# Patient Record
Sex: FEMALE | Race: BLACK OR AFRICAN AMERICAN | Employment: FULL TIME | URBAN - METROPOLITAN AREA
[De-identification: names, ages, dates, MRNs, and addresses within clinical notes are randomized per-mention and may not be internally consistent; named-entity substitution may affect disease eponyms.]

---

## 2017-12-01 ENCOUNTER — HOSPITAL ENCOUNTER (OUTPATIENT)
Dept: SLEEP MEDICINE | Age: 52
Discharge: HOME | End: 2017-12-01
Payer: COMMERCIAL

## 2017-12-01 PROCEDURE — 95811 POLYSOM 6/>YRS CPAP 4/> PARM: CPT

## 2017-12-01 PROCEDURE — 95810 POLYSOM 6/> YRS 4/> PARAM: CPT

## 2018-02-09 ENCOUNTER — HOSPITAL ENCOUNTER (OUTPATIENT)
Dept: MAMMOGRAPHY | Age: 53
Discharge: HOME | End: 2018-02-09
Attending: FAMILY MEDICINE
Payer: COMMERCIAL

## 2018-02-09 DIAGNOSIS — Z12.31 VISIT FOR SCREENING MAMMOGRAM: ICD-10-CM

## 2018-02-09 PROCEDURE — 77067 SCR MAMMO BI INCL CAD: CPT

## 2018-05-08 PROBLEM — N70.11 HYDROSALPINX: Status: ACTIVE | Noted: 2018-05-08

## 2019-03-27 ENCOUNTER — HOSPITAL ENCOUNTER (OUTPATIENT)
Dept: GENERAL RADIOLOGY | Age: 54
Discharge: HOME | End: 2019-03-27
Payer: COMMERCIAL

## 2019-03-27 DIAGNOSIS — R22.1 NECK MASS: ICD-10-CM

## 2019-03-27 PROCEDURE — 71046 X-RAY EXAM CHEST 2 VIEWS: CPT

## 2019-04-01 ENCOUNTER — HOSPITAL ENCOUNTER (OUTPATIENT)
Dept: MAMMOGRAPHY | Age: 54
Discharge: HOME | End: 2019-04-01
Attending: FAMILY MEDICINE
Payer: COMMERCIAL

## 2019-04-01 DIAGNOSIS — Z12.39 ENCOUNTER FOR SCREENING BREAST EXAMINATION: ICD-10-CM

## 2019-04-01 PROCEDURE — 77067 SCR MAMMO BI INCL CAD: CPT

## 2020-09-02 ENCOUNTER — HOSPITAL ENCOUNTER (OUTPATIENT)
Dept: MAMMOGRAPHY | Age: 55
Discharge: HOME | End: 2020-09-02
Attending: FAMILY MEDICINE
Payer: COMMERCIAL

## 2020-09-02 DIAGNOSIS — Z12.31 OTHER SCREENING MAMMOGRAM: ICD-10-CM

## 2020-09-02 PROCEDURE — 77067 SCR MAMMO BI INCL CAD: CPT

## 2021-07-23 ENCOUNTER — TRANSCRIBE ORDER (OUTPATIENT)
Dept: SCHEDULING | Age: 56
End: 2021-07-23

## 2021-07-23 DIAGNOSIS — Z12.31 VISIT FOR SCREENING MAMMOGRAM: Primary | ICD-10-CM

## 2021-09-01 ENCOUNTER — HOSPITAL ENCOUNTER (OUTPATIENT)
Dept: MAMMOGRAPHY | Age: 56
Discharge: HOME | End: 2021-09-01
Attending: FAMILY MEDICINE
Payer: COMMERCIAL

## 2021-09-01 DIAGNOSIS — Z12.31 VISIT FOR SCREENING MAMMOGRAM: ICD-10-CM

## 2021-09-01 PROCEDURE — 77063 BREAST TOMOSYNTHESIS BI: CPT

## 2021-12-07 PROBLEM — E78.5 DYSLIPIDEMIA: Status: ACTIVE | Noted: 2020-05-27

## 2022-03-19 PROBLEM — N70.11 HYDROSALPINX: Status: ACTIVE | Noted: 2018-05-08

## 2022-03-19 PROBLEM — E78.5 DYSLIPIDEMIA: Status: ACTIVE | Noted: 2020-05-27

## 2022-08-17 ENCOUNTER — OFFICE VISIT (OUTPATIENT)
Dept: OCCUPATIONAL MEDICINE | Age: 57
End: 2022-08-17

## 2022-08-17 VITALS
SYSTOLIC BLOOD PRESSURE: 114 MMHG | RESPIRATION RATE: 16 BRPM | HEART RATE: 78 BPM | TEMPERATURE: 98.1 F | WEIGHT: 200 LBS | DIASTOLIC BLOOD PRESSURE: 78 MMHG | BODY MASS INDEX: 31.32 KG/M2 | OXYGEN SATURATION: 99 %
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
## 2022-08-17 PROBLEM — E78.5 DYSLIPIDEMIA: Status: ACTIVE | Noted: 2020-05-27

## 2022-08-17 RX ORDER — ATORVASTATIN CALCIUM 20 MG/1
20 TABLET, FILM COATED ORAL EVERY EVENING
COMMUNITY
Start: 2021-11-14

## 2022-08-17 ASSESSMENT — ENCOUNTER SYMPTOMS
SORE THROAT: 0
RHINORRHEA: 0
COUGH: 0
VOMITING: 0
NAIL CHANGES: 0
EYE PAIN: 0
SHORTNESS OF BREATH: 0
DIARRHEA: 0

## 2022-08-17 NOTE — PROGRESS NOTES
PROGRESS NOTE    SUBJECTIVE:   Nellie Moody is a 65 y.o. female seen for a rash to her lower legs and arms that she noticed yesterday. Reports that she was outside for various family events Saturday and was outside Sunday evening but does not recall noticing an increase in insect activity around her. Denies fever, chills, chest pain, shortness of breath, known poison ivy/oak exposure, changes to her soaps/detergents/perfumes/lotions, new foods/supplements/medications, and/or others in the household with similar symptoms. Denies use of OTC therapies for symptom relief. Chief Complaint    Rash         Rash  This is a new problem. The current episode started yesterday. The affected locations include the left arm and right lower leg. It is unknown if there was an exposure to a precipitant. Pertinent negatives include no anorexia, congestion, cough, diarrhea, eye pain, facial edema, fatigue, fever, joint pain, nail changes, rhinorrhea, shortness of breath, sore throat or vomiting. Past treatments include moisturizer. The treatment provided no relief. Current Outpatient Medications   Medication Sig Dispense Refill    atorvastatin (LIPITOR) 20 MG tablet Take 20 mg by mouth every evening      VITAMIN D PO Take by mouth      ELDERBERRY PO Take by mouth      Multiple Vitamins-Minerals (EMERGEN-C VITAMIN C PO) Take by mouth      Multiple Vitamin (MULTIVITAMIN PO) Take by mouth      esomeprazole (NEXIUM) 40 MG delayed release capsule Take 40 mg by mouth daily      fluticasone (FLONASE) 50 MCG/ACT nasal spray 2 sprays by Nasal route      hydroCHLOROthiazide (HYDRODIURIL) 12.5 MG tablet Take 12.5 mg by mouth daily      irbesartan (AVAPRO) 150 MG tablet Take 150 mg by mouth      loratadine (CLARITIN) 10 MG tablet Take 10 mg by mouth       No current facility-administered medications for this visit.       Allergies   Allergen Reactions    Codeine Hives     Pt states she is not allergic    Hydrocodone-Acetaminophen Other (See Comments) and Rash     Pt states dizzy and funny feeling  Pt states dizzy and funny feeling       Social History     Tobacco Use    Smoking status: Never    Smokeless tobacco: Never   Substance Use Topics    Alcohol use: No     Alcohol/week: 0.0 standard drinks    Drug use: No        Review of Systems   Constitutional:  Negative for chills, fatigue and fever. HENT:  Negative for congestion, rhinorrhea and sore throat. Eyes:  Negative for pain. Respiratory:  Negative for cough and shortness of breath. Cardiovascular:  Negative for chest pain. Gastrointestinal:  Negative for anorexia, diarrhea and vomiting. Musculoskeletal:  Negative for joint pain. Skin:  Positive for rash. Negative for nail changes. OBJECTIVE:  /78 (Site: Left Upper Arm, Position: Sitting, Cuff Size: Large Adult)   Pulse 78   Temp 98.1 °F (36.7 °C) (Oral)   Resp 16   Wt 200 lb (90.7 kg)   SpO2 99%   BMI 31.32 kg/m²      No results found for this visit on 08/17/22. Physical Exam  Vitals reviewed. Constitutional:       General: She is awake. She is not in acute distress. Appearance: Normal appearance. She is well-developed and well-groomed. Eyes:      Pupils: Pupils are equal, round, and reactive to light. Pupils are equal.      Right eye: Pupil is not sluggish. Left eye: Pupil is not sluggish. Cardiovascular:      Rate and Rhythm: Normal rate and regular rhythm. Heart sounds: Normal heart sounds. Pulmonary:      Effort: Pulmonary effort is normal.      Breath sounds: Normal breath sounds. Skin:     Findings: Rash present. Rash is papular. Comments: Scattered raised, slightly erythematous papules noted to bilateral lower legs and arms suspicious for mosquito bites   Neurological:      Mental Status: She is alert and oriented to person, place, and time. Motor: No weakness. Coordination: Coordination is intact. Coordination normal.      Gait: Gait is intact.  Gait normal. Psychiatric:         Behavior: Behavior is cooperative. ASSESSMENT and PLAN    Brad Lerner was seen today for rash. Diagnoses and all orders for this visit:    Papular rash  Comments: Instructed to apply OTC hydrocortisone per package instructions for symptoms relief. Monitor for changes to lesions that could indicate an infection    Mosquito bite, initial encounter  Comments: Instructed to apply OTC hydrocortisone per package instructions for symptoms relief. Monitor for changes to lesions that could indicate an infection    Counseled on benefits of having a primary care provider which includes, but is not limited to, continuity of care and having a medical home when concerns arise. Also enforced that onsite clinic policy states that we are not to take the place of a primary care provider, pt verbalized understanding. SEs and risk vs benefits associated with medications prescribed discussed with patient who verbalized understanding. Pt verbalized understanding and agreement with plan of care. RTC for persisting/worsening symptoms or new complaints that arise. Discussed signs and symptoms that would warrant immediate evaluation including, but not limited to pain, fever >100.5, redness/swelling/drainage or other signs of infection, HA, blurred vision, speech disturbance, difficulty with ambulation/gait, numbness, tingling, weakness, syncope, chest pain, or shortness of breath. I have reviewed the patient's medication list, past medical, family, social, and surgical history in detail and updated the patient record appropriately.     Benigno Rollins, DECLAN - CNP

## 2022-09-07 ENCOUNTER — OFFICE VISIT (OUTPATIENT)
Dept: OCCUPATIONAL MEDICINE | Age: 57
End: 2022-09-07

## 2022-09-07 VITALS
SYSTOLIC BLOOD PRESSURE: 146 MMHG | HEART RATE: 85 BPM | TEMPERATURE: 98.4 F | OXYGEN SATURATION: 99 % | RESPIRATION RATE: 16 BRPM | DIASTOLIC BLOOD PRESSURE: 84 MMHG

## 2022-09-07 DIAGNOSIS — M79.18 INTERCOSTAL MUSCLE PAIN: Primary | ICD-10-CM

## 2022-09-07 ASSESSMENT — ENCOUNTER SYMPTOMS
BACK PAIN: 1
BOWEL INCONTINENCE: 0
ABDOMINAL PAIN: 0

## 2022-09-07 NOTE — PROGRESS NOTES
PROGRESS NOTE    SUBJECTIVE:   Franc Mast is a 65 y.o. female seen for a tightness sensation to her right rib cage that has been intermittent for the past 1-2 weeks but has been persistent all day today. Denies HA, SHOB, difficulty with speech or understanding the speech of others, blurred vision, heartburn/indigestion, abdominal pain, changes in BMs, gait/coordination issues, dizziness, loss of consciousness, syncope/near-syncope, known injury, pain with palpation in the area, or numbness/tingling/weakness in face/arms/legs. Chief Complaint    Back Pain         Back Pain  This is a new problem. The current episode started 1 to 4 weeks ago. The problem has been waxing and waning since onset. The pain is present in the thoracic spine. Quality: tightness. The pain is The same all the time. Pertinent negatives include no abdominal pain, bladder incontinence, bowel incontinence, chest pain, dysuria, fever, headaches, leg pain, numbness, paresis, paresthesias, pelvic pain, perianal numbness, tingling, weakness or weight loss. She has tried nothing for the symptoms. Current Outpatient Medications   Medication Sig Dispense Refill    Multiple Vitamins-Minerals (EMERGEN-C VITAMIN C PO) Take by mouth      Multiple Vitamin (MULTIVITAMIN PO) Take by mouth      atorvastatin (LIPITOR) 20 MG tablet Take 20 mg by mouth every evening      VITAMIN D PO Take by mouth      ELDERBERRY PO Take by mouth      esomeprazole (NEXIUM) 40 MG delayed release capsule Take 40 mg by mouth daily      fluticasone (FLONASE) 50 MCG/ACT nasal spray 2 sprays by Nasal route      hydroCHLOROthiazide (HYDRODIURIL) 12.5 MG tablet Take 12.5 mg by mouth daily      irbesartan (AVAPRO) 150 MG tablet Take 150 mg by mouth      loratadine (CLARITIN) 10 MG tablet Take 10 mg by mouth       No current facility-administered medications for this visit.       Allergies   Allergen Reactions    Codeine Hives     Pt states she is not allergic Hydrocodone-Acetaminophen Other (See Comments) and Rash     Pt states dizzy and funny feeling  Pt states dizzy and funny feeling       Social History     Tobacco Use    Smoking status: Never    Smokeless tobacco: Never   Substance Use Topics    Alcohol use: No     Alcohol/week: 0.0 standard drinks    Drug use: No        Review of Systems   Constitutional:  Negative for fever and weight loss. Cardiovascular:  Negative for chest pain. Gastrointestinal:  Negative for abdominal pain and bowel incontinence. Genitourinary:  Negative for bladder incontinence, dysuria and pelvic pain. Musculoskeletal:  Positive for back pain. Neurological:  Negative for tingling, weakness, numbness, headaches and paresthesias. OBJECTIVE:  BP (!) 146/84 (Site: Left Upper Arm, Position: Sitting, Cuff Size: Large Adult)   Pulse 85   Temp 98.4 °F (36.9 °C) (Oral)   Resp 16   SpO2 99%      No results found for this visit on 09/07/22. Physical Exam  Vitals reviewed. Constitutional:       General: She is awake. She is not in acute distress. Appearance: Normal appearance. She is well-developed and well-groomed. HENT:      Head: Normocephalic. Eyes:      Pupils: Pupils are equal, round, and reactive to light. Pupils are equal.      Right eye: Pupil is not sluggish. Left eye: Pupil is not sluggish. Funduscopic exam:     Right eye: Red reflex present. Left eye: Red reflex present. Cardiovascular:      Rate and Rhythm: Normal rate and regular rhythm. Heart sounds: Normal heart sounds. Pulmonary:      Effort: Pulmonary effort is normal.      Breath sounds: Normal breath sounds. Musculoskeletal:      Cervical back: Neck supple. No swelling, edema, deformity, erythema, signs of trauma, lacerations, rigidity, spasms, torticollis, tenderness, bony tenderness or crepitus. No pain with movement. Normal range of motion. Thoracic back: Spasms and tenderness present.  No swelling, edema, deformity, signs of trauma, lacerations or bony tenderness. Normal range of motion. No scoliosis. Lumbar back: No swelling, edema, deformity, signs of trauma, lacerations, spasms, tenderness or bony tenderness. Normal range of motion. Negative right straight leg raise test and negative left straight leg raise test. No scoliosis. Back:    Skin:     General: Skin is warm and dry. Neurological:      Mental Status: She is alert and oriented to person, place, and time. Cranial Nerves: Cranial nerves are intact. Sensory: Sensation is intact. Motor: Motor function is intact. No weakness. Coordination: Coordination is intact. Coordination normal.      Gait: Gait is intact. Gait normal.      Deep Tendon Reflexes:      Reflex Scores:       Patellar reflexes are 2+ on the right side and 2+ on the left side. Psychiatric:         Behavior: Behavior is cooperative. ASSESSMENT and PLAN    Genna Barnard was seen today for back pain. Diagnoses and all orders for this visit:    Intercostal muscle pain  Comments:  Encouraged cold/heat, massage therapy, stretches, OTC NSAIDs per package instruction, and OTC muscle rubs for relief. F/U with PCP if no improvement      Counseled on benefits of having a primary care provider which includes, but is not limited to, continuity of care and having a medical home when concerns arise. Also enforced that onsite clinic policy states that we are not to take the place of a primary care provider, pt verbalized understanding. SEs and risk vs benefits associated with medications prescribed discussed with patient who verbalized understanding. Pt verbalized understanding and agreement with plan of care. RTC for persisting/worsening symptoms or new complaints that arise.  Discussed signs and symptoms that would warrant immediate evaluation including, but not limited to HA, blurred vision, speech disturbance, difficulty with ambulation/gait, numbness, tingling, weakness, syncope, chest pain, or shortness of breath. I have reviewed the patient's medication list, past medical, family, social, and surgical history in detail and updated the patient record appropriately.     DECLAN Pereira - CNP

## 2022-09-27 ENCOUNTER — HOSPITAL ENCOUNTER (OUTPATIENT)
Dept: MAMMOGRAPHY | Age: 57
Discharge: HOME | End: 2022-09-30
Payer: COMMERCIAL

## 2022-09-27 DIAGNOSIS — Z12.31 ENCOUNTER FOR SCREENING MAMMOGRAM FOR MALIGNANT NEOPLASM OF BREAST: ICD-10-CM

## 2022-09-27 PROCEDURE — 77063 BREAST TOMOSYNTHESIS BI: CPT

## 2022-10-27 ENCOUNTER — OFFICE VISIT (OUTPATIENT)
Dept: OCCUPATIONAL MEDICINE | Age: 57
End: 2022-10-27

## 2022-10-27 VITALS
SYSTOLIC BLOOD PRESSURE: 112 MMHG | DIASTOLIC BLOOD PRESSURE: 76 MMHG | RESPIRATION RATE: 16 BRPM | HEART RATE: 80 BPM | TEMPERATURE: 97.9 F | OXYGEN SATURATION: 99 %

## 2022-10-27 DIAGNOSIS — H61.22 IMPACTED CERUMEN OF LEFT EAR: Primary | ICD-10-CM

## 2022-10-27 ASSESSMENT — ENCOUNTER SYMPTOMS
EYE ITCHING: 0
EYE DISCHARGE: 0
SINUS PAIN: 0
COUGH: 0
ABDOMINAL PAIN: 0
DIARRHEA: 0
EYE PAIN: 0
EYE REDNESS: 0
RHINORRHEA: 0
SINUS PRESSURE: 0
SORE THROAT: 0
VOMITING: 0

## 2022-10-27 NOTE — PROGRESS NOTES
PROGRESS NOTE    SUBJECTIVE:   Christopher Faith is a 65 y.o. female seen for issue with her left ear for the past 2 weeks. Reports that when she lays on her left ear at night that it is \"sore\" and that her hearing is muffled some. Chief Complaint    Ear Fullness         Ear Fullness   There is pain in the left ear. This is a new problem. The current episode started 1 to 4 weeks ago. The problem has been unchanged. There has been no fever. Pertinent negatives include no abdominal pain, coughing, diarrhea, ear discharge, headaches, hearing loss, neck pain, rash, rhinorrhea, sore throat or vomiting. She has tried nothing for the symptoms. Current Outpatient Medications   Medication Sig Dispense Refill    Multiple Vitamins-Minerals (EMERGEN-C VITAMIN C PO) Take by mouth      Multiple Vitamin (MULTIVITAMIN PO) Take by mouth      atorvastatin (LIPITOR) 20 MG tablet Take 20 mg by mouth every evening      VITAMIN D PO Take by mouth      ELDERBERRY PO Take by mouth      esomeprazole (NEXIUM) 40 MG delayed release capsule Take 40 mg by mouth daily      fluticasone (FLONASE) 50 MCG/ACT nasal spray 2 sprays by Nasal route      hydroCHLOROthiazide (HYDRODIURIL) 12.5 MG tablet Take 12.5 mg by mouth daily      irbesartan (AVAPRO) 150 MG tablet Take 150 mg by mouth      loratadine (CLARITIN) 10 MG tablet Take 10 mg by mouth       No current facility-administered medications for this visit. Allergies   Allergen Reactions    Codeine Hives     Pt states she is not allergic    Hydrocodone-Acetaminophen Other (See Comments) and Rash     Pt states dizzy and funny feeling  Pt states dizzy and funny feeling       Social History     Tobacco Use    Smoking status: Never    Smokeless tobacco: Never   Substance Use Topics    Alcohol use: No     Alcohol/week: 0.0 standard drinks    Drug use: No        Review of Systems   Constitutional:  Negative for chills, fatigue and fever. HENT:  Positive for ear pain.  Negative for ear discharge, hearing loss, postnasal drip, rhinorrhea, sinus pressure, sinus pain, sneezing and sore throat. Eyes:  Negative for pain, discharge, redness and itching. Respiratory:  Negative for cough. Gastrointestinal:  Negative for abdominal pain, diarrhea and vomiting. Musculoskeletal:  Negative for myalgias and neck pain. Skin:  Negative for rash. Neurological:  Negative for dizziness, light-headedness and headaches. OBJECTIVE:  /76 (Site: Left Upper Arm, Position: Sitting, Cuff Size: Medium Adult)   Pulse 80   Temp 97.9 °F (36.6 °C) (Oral)   Resp 16   SpO2 99%      No results found for this visit on 10/27/22. Physical Exam  Vitals reviewed. Constitutional:       General: She is awake. She is not in acute distress. Appearance: Normal appearance. She is well-developed and well-groomed. HENT:      Head: Normocephalic. Right Ear: Hearing, ear canal and external ear normal. No drainage, swelling or tenderness. A middle ear effusion is present. Left Ear: Hearing, ear canal and external ear normal. No drainage, swelling or tenderness. A middle ear effusion is present. There is impacted cerumen. Nose: Mucosal edema and rhinorrhea present. Rhinorrhea is clear. Right Turbinates: Swollen and pale. Left Turbinates: Swollen and pale. Right Sinus: No maxillary sinus tenderness or frontal sinus tenderness. Left Sinus: No maxillary sinus tenderness or frontal sinus tenderness. Mouth/Throat:      Mouth: Mucous membranes are moist.      Pharynx: Oropharynx is clear. Uvula midline. No pharyngeal swelling or posterior oropharyngeal erythema. Tonsils: No tonsillar exudate. Comments: PND with cobblestoning  Eyes:      General: Lids are normal. No allergic shiner. Conjunctiva/sclera: Conjunctivae normal.      Right eye: Right conjunctiva is not injected. No chemosis. Left eye: Left conjunctiva is not injected. No chemosis.   Neck:      Thyroid: No thyromegaly. Trachea: Trachea normal.   Cardiovascular:      Rate and Rhythm: Normal rate and regular rhythm. Heart sounds: Normal heart sounds. Pulmonary:      Effort: Pulmonary effort is normal.      Breath sounds: Normal breath sounds. Musculoskeletal:      Cervical back: Normal range of motion and neck supple. Lymphadenopathy:      Head:      Right side of head: No submental, submandibular, tonsillar, preauricular, posterior auricular or occipital adenopathy. Left side of head: No submental, submandibular, tonsillar, preauricular, posterior auricular or occipital adenopathy. Skin:     General: Skin is warm and dry. Neurological:      Mental Status: She is alert and oriented to person, place, and time. Psychiatric:         Behavior: Behavior is cooperative. ASSESSMENT and PLAN    Helga Jeffrey was seen today for ear fullness. Diagnoses and all orders for this visit:    Impacted cerumen of left ear  Comments:  Cerumen impaction removed with currete, pt tolerated procedure well. Discussed avoiding qtip use for ear cleaning and to alternate earbud use to prevent    Orders:  -     [address] - [place] REMOVE IMPACTED EAR WAX      Counseled on benefits of having a primary care provider which includes, but is not limited to, continuity of care and having a medical home when concerns arise. Also enforced that onsite clinic policy states that we are not to take the place of a primary care provider, pt verbalized understanding. SEs and risk vs benefits associated with medications prescribed discussed with patient who verbalized understanding. Pt verbalized understanding and agreement with plan of care. RTC for persisting/worsening symptoms or new complaints that arise.  Discussed signs and symptoms that would warrant immediate evaluation including, but not limited to HA, blurred vision, speech disturbance, difficulty with ambulation/gait, numbness, tingling, weakness, syncope, chest pain, or shortness of breath. I have reviewed the patient's medication list, past medical, family, social, and surgical history in detail and updated the patient record appropriately.     DECLAN Stanford - CNP

## 2022-11-16 ENCOUNTER — OFFICE VISIT (OUTPATIENT)
Dept: OCCUPATIONAL MEDICINE | Age: 57
End: 2022-11-16

## 2022-11-16 DIAGNOSIS — Z71.2 ENCOUNTER TO DISCUSS TEST RESULTS: Primary | ICD-10-CM

## 2022-11-16 NOTE — PROGRESS NOTES
PROGRESS NOTE    SUBJECTIVE:   Rosalee Cook is a 65 y.o. female seen for results of recent employer Health Risk Assessment. Previous results will also be reviewed to show how values have been trending. Established patient . No complaints or concerns at this time  Chief Complaint    Results            Current Outpatient Medications   Medication Sig Dispense Refill    Multiple Vitamins-Minerals (EMERGEN-C VITAMIN C PO) Take by mouth      Multiple Vitamin (MULTIVITAMIN PO) Take by mouth      atorvastatin (LIPITOR) 20 MG tablet Take 20 mg by mouth every evening      VITAMIN D PO Take by mouth      ELDERBERRY PO Take by mouth      esomeprazole (NEXIUM) 40 MG delayed release capsule Take 40 mg by mouth daily      fluticasone (FLONASE) 50 MCG/ACT nasal spray 2 sprays by Nasal route      hydroCHLOROthiazide (HYDRODIURIL) 12.5 MG tablet Take 12.5 mg by mouth daily      irbesartan (AVAPRO) 150 MG tablet Take 150 mg by mouth      loratadine (CLARITIN) 10 MG tablet Take 10 mg by mouth       No current facility-administered medications for this visit. Allergies   Allergen Reactions    Codeine Hives     Pt states she is not allergic    Hydrocodone-Acetaminophen Other (See Comments) and Rash     Pt states dizzy and funny feeling  Pt states dizzy and funny feeling       Social History     Tobacco Use    Smoking status: Never    Smokeless tobacco: Never   Substance Use Topics    Alcohol use: No     Alcohol/week: 0.0 standard drinks    Drug use: No           OBJECTIVE:  There were no vitals taken for this visit. T. CHOL: 173  T  HDL: 42  VLDL: 13  LDL: 118    GLUCOSE: 92  A1C: 5.7    ASSESSMENT and PLAN    Magdy Jeff was seen today for results.     Diagnoses and all orders for this visit:    Encounter to discuss test results    Discussed mid-year screening event usually around May if she would like to attempt for the credits she missed for the remainder of the year at that time and what goals she should strive for to meet the

## 2023-02-15 ENCOUNTER — OFFICE VISIT (OUTPATIENT)
Dept: VASCULAR SURGERY | Age: 58
End: 2023-02-15
Payer: COMMERCIAL

## 2023-02-15 VITALS
BODY MASS INDEX: 32.18 KG/M2 | HEIGHT: 67 IN | DIASTOLIC BLOOD PRESSURE: 69 MMHG | WEIGHT: 205 LBS | OXYGEN SATURATION: 100 % | SYSTOLIC BLOOD PRESSURE: 116 MMHG | HEART RATE: 69 BPM

## 2023-02-15 DIAGNOSIS — E78.5 DYSLIPIDEMIA: Primary | ICD-10-CM

## 2023-02-15 PROCEDURE — 99213 OFFICE O/P EST LOW 20 MIN: CPT | Performed by: NURSE PRACTITIONER

## 2023-02-15 PROCEDURE — 3078F DIAST BP <80 MM HG: CPT | Performed by: NURSE PRACTITIONER

## 2023-02-15 PROCEDURE — 3074F SYST BP LT 130 MM HG: CPT | Performed by: NURSE PRACTITIONER

## 2023-02-15 NOTE — PROGRESS NOTES
DATE OF VISIT: 2/15/2023      HPI  Nellie Moody is a 65 y.o. female is here in follow up for her yearly follow up for her upper extremity duplex. No issues since her last visit. She is taking cholesterol lowering medication. No ASA or blooding thinning medication. MEDICAL HISTORY:   Past Medical History:   Diagnosis Date    DDD (degenerative disc disease), cervical 2016    assoc radicular pain to shoulder/arm, xray cspine 2017    H/O pelvic ultrasound 06/2015    + hydrosalpinx    Hiatal hernia with GERD 07/2015    s/p EGD + [facility], + esophageal ring    HLD (hyperlipidemia) 2015    atorvastatin 12/2016, kaleigh score 0 may 2015    HTN (hypertension) 1/1/2016    Exercise Nuclear Stress test 5/2016 = normal, CXR nl, toprol = fatigue/mood    HTN (hypertension) 1/1/2016    HTN (hypertension) 2016    Exercise Nuclear Stress test 5/2016 = normal, CXR nl, toprol = fatigue/mood    Internal hemorrhoid 07/2015    Perimenopausal 2016    conrad root         SURGICAL HISTORY:   Past Surgical History:   Procedure Laterality Date    GYN      hysterectomy    HYSTERECTOMY (CERVIX STATUS UNKNOWN)      MYOMECTOMY      PARTIAL HYSTERECTOMY (CERVIX NOT REMOVED)      2003    [place] ABDOMEN SURGERY PROC UNLISTED      hernia    TUBAL LIGATION         Review of Systems:  Constitutional:   Negative for fevers and unexplained weight loss. Respiratory:   Negative for hemoptysis. Cardiovascular:   Negative except as noted in HPI. Musculoskeletal:  Negative for active, unexplained/severe joint pain.       ALLERGIES:   Allergies   Allergen Reactions    Codeine Hives     Pt states she is not allergic    Hydrocodone-Acetaminophen Other (See Comments) and Rash     Pt states dizzy and funny feeling  Pt states dizzy and funny feeling       CURRENT MEDICATIONS:  Current Outpatient Medications   Medication Sig Dispense Refill    Multiple Vitamins-Minerals (EMERGEN-C VITAMIN C PO) Take by mouth      Multiple Vitamin (MULTIVITAMIN PO) Take by mouth atorvastatin (LIPITOR) 20 MG tablet Take 20 mg by mouth every evening      VITAMIN D PO Take by mouth      ELDERBERRY PO Take by mouth      esomeprazole (NEXIUM) 40 MG delayed release capsule Take 40 mg by mouth daily      fluticasone (FLONASE) 50 MCG/ACT nasal spray 2 sprays by Nasal route      hydroCHLOROthiazide (HYDRODIURIL) 12.5 MG tablet Take 12.5 mg by mouth daily      irbesartan (AVAPRO) 150 MG tablet Take 150 mg by mouth      loratadine (CLARITIN) 10 MG tablet Take 10 mg by mouth       No current facility-administered medications for this visit. IMAGING: Interpretation Summary         Right side findings: WBI is 0.98. The upper extremity arterial duplex reveals no significant arterial disease. The proximal subclavian artery measures 1.7cm AP essentially unchanged from previous exam dated 12/16/2020. Left side findings: WBI is 0.98. The upper extremity arterial duplex reveals no significant arterial disease. Procedure Details    A gray scale, color Doppler imaging and spectral Doppler analysis ultrasound was performed. During the study longitudinal views were obtained. Pulsed wave doppler, pulsed volume recording (PVR) and photo plethysmography was performed. Overall the study quality was good. Upper Extremity Arterial Findings    Right Upper Arterial    Innominate Artery: Multiphasic. Subclavian Artery: Multiphasic. Axillary Artery: Multiphasic. Brachial Artery: Multiphasic. Radial Artery: Multiphasic. Ulnar Artery: multiphasic. Left Upper Arterial    Subclavian Artery: Multiphasic. Axillary Artery: Multiphasic. Brachial Artery: Multiphasic. Radial Artery: Multiphasic. Ulnar Artery: Multiphasic.      Carotid Measurements     Right PSV Right EDV Left PSV Left EDV   Vertebral 59.1 cm/s        17 cm/s        54.7 cm/s        19.3 cm/s          Subclavian Prox 224 cm/s            137 cm/s              Subclavian Mid 140 cm/s        10.3 cm/s        107 cm/s 0 cm/s          Subclavian Dist 123 cm/s            119 cm/s                Upper Arterial Measurements     PSV Right PSV Right EDV Left PSV Left EDV   Innominate 195 cm/s              Axillary  130 cm/s         124 cm/s           Brach Prox  151 cm/s        0 cm/s        137 cm/s        14.8 cm/s          Brach Mid  124 cm/s        0 cm/s        126 cm/s        0 cm/s          Brach Dist  108 cm/s        1.9 cm/s        118 cm/s        0 cm/s          Radial Prox  78.2 cm/s         91.7 cm/s           Radial Mid  106 cm/s         91.1 cm/s           Radial Dist  97.6 cm/s         97.4 cm/s           Ulnar Dist  119 cm/s         96.5 cm/s             Arterial Pressure Measurements     Right Left   3rd Digit 103 mmHg        117 mmHg          DBI (3rd) 0.82        0.94          Radial  mmHg        116 mmHg          Ulnar  mmHg        122 mmHg          Brachial  mmHg        121 mmHg          WBI 0.98        0.98                                    Procedure Staff    Technologist/Clinician: Valdo Norwood  Supporting Staff: None  Performing Physician/Midlevel: None     Exam Completion Date/Time: 2/15/23  2:52 PM      PHYSICAL EXAM  VITALS: /69 (Site: Left Upper Arm, Position: Sitting, Cuff Size: Medium Adult)   Pulse 69   Ht 5' 7\" (1.702 m)   Wt 205 lb (93 kg)   SpO2 100%   BMI 32.11 kg/m²       GENERAL: Well developed, well nourished, in NAD  HEAD/NECK: normocephalic, atraumatic, neck supple  HEART: Regular rate and rhythm  ABDOMEN: soft, nontender, nondistended  EXTREMITIES: upper without CCE with palpable radial and brachial pulses. MUSCULOSKELETAL: normal gait  NEURO: sensation and strength grossly intact and symmetrical  PSYCH: alert and oriented to person, place and time      Assessment/Plan: Patient is a 78-year-old female who follows up for her upper extremity duplex. She denies any upper extremity pain or ulcerations.   The proximal subclavian artery on the right measures 1.7 cm which has essentially remained stable  and has not changed in size. We will continue to follow her with ultrasound duplex studies in 1 year. She will return in 1 year sooner should any issues arise.          Pauly Tello, APRN - CNP    20 minutes of time was spent on this encounter including chart review, assessment and evaluation

## 2023-05-11 ENCOUNTER — OFFICE VISIT (OUTPATIENT)
Dept: OCCUPATIONAL MEDICINE | Age: 58
End: 2023-05-11

## 2023-05-11 VITALS
TEMPERATURE: 97.4 F | SYSTOLIC BLOOD PRESSURE: 144 MMHG | DIASTOLIC BLOOD PRESSURE: 92 MMHG | HEART RATE: 65 BPM | OXYGEN SATURATION: 99 %

## 2023-05-11 DIAGNOSIS — Z01.30 BLOOD PRESSURE CHECK: Primary | ICD-10-CM

## 2023-05-11 ASSESSMENT — ENCOUNTER SYMPTOMS
ABDOMINAL PAIN: 0
COUGH: 0
SHORTNESS OF BREATH: 0
CHOKING: 0
CHEST TIGHTNESS: 1

## 2023-05-11 NOTE — PROGRESS NOTES
PROGRESS NOTE    SUBJECTIVE:   Nava Bishop is a 63 y.o. female seen for blood pressure check. She checked her blood pressure Tuesday afternoon at her sisters house and noticed it was high, so she went to the emergency department. While at the ED her blood pressure gradually decreased without intervention. She states she missed her blood pressure medication 2 days ago, but has caught up now. She got home from the emergency department around 4:30am Wednesday morning, she was able to stay home and rest yesterday. Today at work she checked her blood pressure at her desk and it was high, so she decided to come to the clinic. She does have chest tightness. She states she does not have a headache, difficulty breathing, dizziness, nausea, or vomiting. She states she has been under stress at home with a new puppy, which she states may be associating to her increased blood pressure and chest pain. She states is having back pain, she states this is a muscular pain and caused by a knot in her neck. BLOOD PRESSURE CHECK      Current Outpatient Medications   Medication Sig Dispense Refill    Multiple Vitamins-Minerals (EMERGEN-C VITAMIN C PO) Take by mouth      Multiple Vitamin (MULTIVITAMIN PO) Take by mouth      atorvastatin (LIPITOR) 20 MG tablet Take 20 mg by mouth every evening      VITAMIN D PO Take by mouth      ELDERBERRY PO Take by mouth      esomeprazole (NEXIUM) 40 MG delayed release capsule Take 40 mg by mouth daily      fluticasone (FLONASE) 50 MCG/ACT nasal spray 2 sprays by Nasal route      hydroCHLOROthiazide (HYDRODIURIL) 12.5 MG tablet Take 12.5 mg by mouth daily      irbesartan (AVAPRO) 150 MG tablet Take 150 mg by mouth      loratadine (CLARITIN) 10 MG tablet Take 10 mg by mouth       No current facility-administered medications for this visit.       Allergies   Allergen Reactions    Codeine Hives     Pt states she is not allergic    Hydrocodone-Acetaminophen Other (See Comments) and Rash     Pt

## 2023-06-06 ENCOUNTER — OFFICE VISIT (OUTPATIENT)
Dept: OCCUPATIONAL MEDICINE | Age: 58
End: 2023-06-06

## 2023-06-06 VITALS — HEIGHT: 66 IN | WEIGHT: 195.6 LBS | BODY MASS INDEX: 31.43 KG/M2

## 2023-06-06 DIAGNOSIS — Z00.8 ENCOUNTER FOR BIOMETRIC SCREENING: Primary | ICD-10-CM

## 2023-06-06 RX ORDER — LORAZEPAM 0.5 MG/1
TABLET ORAL
COMMUNITY
Start: 2023-05-15

## 2023-06-06 NOTE — PROGRESS NOTES
PROGRESS NOTE    SUBJECTIVE:   Demetria Sauceda is a 63 y.o. female seen for quarterly employment physical to see if their values have improved since last screening to gain any previously missed insurance premium discounts for remainder of the year. No complaints or concerns at this time. Chief Complaint    Employment Physical           OBJECTIVE:  Ht 5' 6\" (1.676 m)   Wt 195 lb 9.6 oz (88.7 kg)   BMI 31.57 kg/m²      Physical Exam  Vitals reviewed. Constitutional:       General: She is awake. She is not in acute distress. Appearance: Normal appearance. She is well-developed and well-groomed. Eyes:      Pupils: Pupils are equal, round, and reactive to light. Pupils are equal.      Right eye: Pupil is not sluggish. Left eye: Pupil is not sluggish. Pulmonary:      Effort: Pulmonary effort is normal.   Neurological:      Mental Status: She is alert and oriented to person, place, and time. Motor: No weakness. Coordination: Coordination is intact. Coordination normal.      Gait: Gait is intact. Gait normal.   Psychiatric:         Behavior: Behavior is cooperative. ASSESSMENT and PLAN    Nita Wagner was seen today for employment physical.    Diagnoses and all orders for this visit:    Encounter for biometric screening    No additional credits awarded based on this quarter's values. Will update HR to any new credits that need to be applied for next quarter discounts. Discussed TLCs and goals to work towards for next quarter if no additional credits awarded at this time    Counseled on benefits of having a primary care provider which includes, but is not limited to, continuity of care and having a medical home when concerns arise. Also enforced that onsite clinic policy states that we are not to take the place of a primary care provider, pt verbalized understanding.      I have reviewed the patient's medication list, past medical, family, social, and surgical history in detail and updated the

## 2023-09-21 ENCOUNTER — TRANSCRIBE ORDERS (OUTPATIENT)
Dept: SCHEDULING | Age: 58
End: 2023-09-21

## 2023-09-21 DIAGNOSIS — Z12.31 ENCOUNTER FOR SCREENING MAMMOGRAM FOR MALIGNANT NEOPLASM OF BREAST: Primary | ICD-10-CM

## 2023-09-28 ENCOUNTER — TRANSCRIBE ORDERS (OUTPATIENT)
Dept: SCHEDULING | Age: 58
End: 2023-09-28

## 2023-09-28 DIAGNOSIS — Z12.31 VISIT FOR SCREENING MAMMOGRAM: Primary | ICD-10-CM

## 2023-10-10 ENCOUNTER — OFFICE VISIT (OUTPATIENT)
Dept: OCCUPATIONAL MEDICINE | Age: 58
End: 2023-10-10

## 2023-10-10 VITALS
HEIGHT: 66 IN | BODY MASS INDEX: 29.63 KG/M2 | DIASTOLIC BLOOD PRESSURE: 88 MMHG | SYSTOLIC BLOOD PRESSURE: 120 MMHG | WEIGHT: 184.4 LBS | HEART RATE: 82 BPM

## 2023-10-10 DIAGNOSIS — Z00.8 ENCOUNTER FOR BIOMETRIC SCREENING: Primary | ICD-10-CM

## 2023-10-11 LAB
CHOLEST SERPL-MCNC: 171 MG/DL (ref 100–199)
CHOLEST/HDLC SERPL: 4.2 RATIO (ref 0–4.4)
GLUCOSE SERPL-MCNC: 90 MG/DL (ref 70–99)
HBA1C MFR BLD: 5.8 % (ref 4.8–5.6)
HDLC SERPL-MCNC: 41 MG/DL
LDLC SERPL CALC-MCNC: 116 MG/DL (ref 0–99)
TRIGL SERPL-MCNC: 71 MG/DL (ref 0–149)
VLDLC SERPL CALC-MCNC: 14 MG/DL (ref 5–40)

## 2024-02-13 ENCOUNTER — OFFICE VISIT (OUTPATIENT)
Age: 59
End: 2024-02-13

## 2024-02-13 VITALS
OXYGEN SATURATION: 99 % | TEMPERATURE: 98.1 F | HEART RATE: 85 BPM | RESPIRATION RATE: 16 BRPM | DIASTOLIC BLOOD PRESSURE: 74 MMHG | SYSTOLIC BLOOD PRESSURE: 112 MMHG

## 2024-02-13 DIAGNOSIS — J32.9 RHINOSINUSITIS: Primary | ICD-10-CM

## 2024-02-13 LAB
INFLUENZA A ANTIGEN, POC: NEGATIVE
INFLUENZA B ANTIGEN, POC: NEGATIVE
SARS-COV-2 RNA, POC: NEGATIVE

## 2024-02-13 NOTE — PROGRESS NOTES
PROGRESS NOTE    SUBJECTIVE:   Judith Alcantara is a 58 y.o. female seen for sinus pressure and headache that started yesterday. Reports that she has not taken her Claritin for the past 2 days and had only about 2 hours of sleep yesterday when she started to feel bad. Denies any known or suspected sick contacts    Chief Complaint    Sinusitis         Sinusitis  This is a new problem. The current episode started yesterday. The problem is unchanged. There has been no fever. The pain is moderate. Associated symptoms include congestion, headaches and sinus pressure. Pertinent negatives include no chills, coughing, diaphoresis, ear pain, hoarse voice, neck pain, shortness of breath, sneezing, sore throat or swollen glands. Past treatments include acetaminophen. The treatment provided mild relief.       Current Outpatient Medications   Medication Sig Dispense Refill    LORazepam (ATIVAN) 0.5 MG tablet TAKE 1 TABLET BY MOUTH TWICE DAILY AS NEEDED FOR STRESS      Multiple Vitamins-Minerals (EMERGEN-C VITAMIN C PO) Take by mouth      Multiple Vitamin (MULTIVITAMIN PO) Take by mouth      atorvastatin (LIPITOR) 20 MG tablet Take 20 mg by mouth every evening      VITAMIN D PO Take by mouth      ELDERBERRY PO Take by mouth      esomeprazole (NEXIUM) 40 MG delayed release capsule Take 40 mg by mouth daily      fluticasone (FLONASE) 50 MCG/ACT nasal spray 2 sprays by Nasal route      irbesartan (AVAPRO) 150 MG tablet Take 150 mg by mouth      loratadine (CLARITIN) 10 MG tablet Take 10 mg by mouth       No current facility-administered medications for this visit.      Allergies   Allergen Reactions    Codeine Hives     Pt states she is not allergic    Hydrocodone-Acetaminophen Other (See Comments) and Rash     Pt states dizzy and funny feeling  Pt states dizzy and funny feeling       Social History     Tobacco Use    Smoking status: Never    Smokeless tobacco: Never   Substance Use Topics    Alcohol use: No     Alcohol/week: 0.0

## 2024-02-19 ENCOUNTER — OFFICE VISIT (OUTPATIENT)
Age: 59
End: 2024-02-19

## 2024-02-19 VITALS
TEMPERATURE: 98.3 F | SYSTOLIC BLOOD PRESSURE: 116 MMHG | HEART RATE: 90 BPM | RESPIRATION RATE: 16 BRPM | OXYGEN SATURATION: 98 % | DIASTOLIC BLOOD PRESSURE: 86 MMHG

## 2024-02-19 DIAGNOSIS — R05.1 ACUTE COUGH: ICD-10-CM

## 2024-02-19 DIAGNOSIS — J32.9 RHINOSINUSITIS: Primary | ICD-10-CM

## 2024-02-19 RX ORDER — BENZONATATE 200 MG/1
200 CAPSULE ORAL 3 TIMES DAILY PRN
Qty: 30 CAPSULE | Refills: 0 | Status: SHIPPED | OUTPATIENT
Start: 2024-02-19

## 2024-02-19 RX ORDER — AMOXICILLIN AND CLAVULANATE POTASSIUM 875; 125 MG/1; MG/1
1 TABLET, FILM COATED ORAL 2 TIMES DAILY
Qty: 10 TABLET | Refills: 0 | Status: SHIPPED | OUTPATIENT
Start: 2024-02-19 | End: 2024-02-20 | Stop reason: ALTCHOICE

## 2024-02-19 ASSESSMENT — ENCOUNTER SYMPTOMS
SINUS PRESSURE: 0
NAUSEA: 0
HEMOPTYSIS: 0
EYE PAIN: 0
COUGH: 1
SHORTNESS OF BREATH: 0
HEARTBURN: 0
WHEEZING: 0
SORE THROAT: 0
DIARRHEA: 0
EYE ITCHING: 0
EYE REDNESS: 0
EYE DISCHARGE: 0
SINUS PAIN: 0
RHINORRHEA: 0
VOMITING: 0
CHEST TIGHTNESS: 0

## 2024-02-19 NOTE — PROGRESS NOTES
PROGRESS NOTE    SUBJECTIVE:   Judith Alcantara is a 58 y.o. female seen for worsening cough following recent cold and allergy exacerbation.    Chief Complaint    Cough         Cough  This is a new problem. The current episode started 1 to 4 weeks ago. The problem has been gradually worsening. The cough is Non-productive. Associated symptoms include postnasal drip. Pertinent negatives include no chest pain, chills, ear congestion, ear pain, eye redness, fever, headaches, heartburn, hemoptysis, myalgias, nasal congestion, rash, rhinorrhea, sore throat, shortness of breath, sweats, weight loss or wheezing. Associated symptoms comments: laryngitis. She has tried OTC cough suppressant and prescription cough suppressant (claritin, flonase, coricidin, tessalon perles) for the symptoms. The treatment provided moderate relief. Her past medical history is significant for environmental allergies.       Current Outpatient Medications   Medication Sig Dispense Refill    amoxicillin-clavulanate (AUGMENTIN) 875-125 MG per tablet Take 1 tablet by mouth 2 times daily for 5 days 10 tablet 0    benzonatate (TESSALON) 200 MG capsule Take 1 capsule by mouth 3 times daily as needed for Cough 30 capsule 0    LORazepam (ATIVAN) 0.5 MG tablet TAKE 1 TABLET BY MOUTH TWICE DAILY AS NEEDED FOR STRESS      Multiple Vitamins-Minerals (EMERGEN-C VITAMIN C PO) Take by mouth      Multiple Vitamin (MULTIVITAMIN PO) Take by mouth      atorvastatin (LIPITOR) 20 MG tablet Take 20 mg by mouth every evening      VITAMIN D PO Take by mouth      ELDERBERRY PO Take by mouth      esomeprazole (NEXIUM) 40 MG delayed release capsule Take 40 mg by mouth daily      fluticasone (FLONASE) 50 MCG/ACT nasal spray 2 sprays by Nasal route      irbesartan (AVAPRO) 150 MG tablet Take 150 mg by mouth      loratadine (CLARITIN) 10 MG tablet Take 10 mg by mouth       No current facility-administered medications for this visit.      Allergies   Allergen Reactions    Codeine

## 2024-02-20 ENCOUNTER — OFFICE VISIT (OUTPATIENT)
Dept: VASCULAR SURGERY | Age: 59
End: 2024-02-20
Payer: COMMERCIAL

## 2024-02-20 VITALS
BODY MASS INDEX: 30.53 KG/M2 | WEIGHT: 190 LBS | HEIGHT: 66 IN | OXYGEN SATURATION: 95 % | SYSTOLIC BLOOD PRESSURE: 137 MMHG | HEART RATE: 61 BPM | DIASTOLIC BLOOD PRESSURE: 82 MMHG

## 2024-02-20 DIAGNOSIS — I73.9 PAD (PERIPHERAL ARTERY DISEASE) (HCC): Primary | ICD-10-CM

## 2024-02-20 PROCEDURE — 3075F SYST BP GE 130 - 139MM HG: CPT | Performed by: NURSE PRACTITIONER

## 2024-02-20 PROCEDURE — 3079F DIAST BP 80-89 MM HG: CPT | Performed by: NURSE PRACTITIONER

## 2024-02-20 PROCEDURE — 99213 OFFICE O/P EST LOW 20 MIN: CPT | Performed by: NURSE PRACTITIONER

## 2024-02-20 NOTE — PROGRESS NOTES
and rhythm  ABDOMEN: soft, nontender, nondistended  EXTREMITIES: upper without CCE with palpable radial and brachial pulses   MUSCULOSKELETAL: normal gait  NEURO: sensation and strength grossly intact and symmetrical  PSYCH: alert and oriented to person, place and time      Assessment/Plan: Patient is a 57-year-old female who follows up for her upper extremity duplex.  She denies any upper extremity pain or ulcerations.  The proximal subclavian artery on the right measures 1.4 cm which has essentially remained stable  and has not changed in size.  We will continue to follow her with ultrasound duplex studies in 1 year. She will return in 1 year sooner should any issues arise.            Pauly Tello, APRN - CNP    20 minutes of time was spent on this encounter including chart review, assessment and evaluation

## 2024-04-02 ENCOUNTER — HOSPITAL ENCOUNTER (OUTPATIENT)
Dept: MAMMOGRAPHY | Age: 59
Discharge: HOME | End: 2024-04-05
Payer: COMMERCIAL

## 2024-04-02 PROCEDURE — 77063 BREAST TOMOSYNTHESIS BI: CPT

## 2024-04-30 ENCOUNTER — OFFICE VISIT (OUTPATIENT)
Age: 59
End: 2024-04-30

## 2024-04-30 VITALS
DIASTOLIC BLOOD PRESSURE: 84 MMHG | OXYGEN SATURATION: 99 % | RESPIRATION RATE: 16 BRPM | HEART RATE: 61 BPM | SYSTOLIC BLOOD PRESSURE: 134 MMHG

## 2024-04-30 DIAGNOSIS — Z01.30 BLOOD PRESSURE CHECK: Primary | ICD-10-CM

## 2024-04-30 ASSESSMENT — ENCOUNTER SYMPTOMS
WHEEZING: 0
BACK PAIN: 0
CHEST TIGHTNESS: 0
SHORTNESS OF BREATH: 0

## 2024-04-30 NOTE — PROGRESS NOTES
PROGRESS NOTE    SUBJECTIVE:   Judith Alcantara is a 59 y.o. female seen for blood pressure check.      BLOOD PRESSURE CHECK    Pt presents to the clinic for a BP check due to some intermittent chest pain that began yesterday. She takes daily medication for BP and has a BP cuff at home. She denies SOB, dizziness, chest pressure with radiation, syncope, diaphoresis and anxiety. She does have a hx of acid reflux and takes a daily nexium and has had some intermittent abdominal issue recently and realizes this may be root of her pain, but she wanted to have a manual BP check for reassurance.     Current Outpatient Medications   Medication Sig Dispense Refill    benzonatate (TESSALON) 200 MG capsule Take 1 capsule by mouth 3 times daily as needed for Cough 30 capsule 0    LORazepam (ATIVAN) 0.5 MG tablet TAKE 1 TABLET BY MOUTH TWICE DAILY AS NEEDED FOR STRESS      Multiple Vitamins-Minerals (EMERGEN-C VITAMIN C PO) Take by mouth      Multiple Vitamin (MULTIVITAMIN PO) Take by mouth      atorvastatin (LIPITOR) 20 MG tablet Take 1 tablet by mouth every evening      VITAMIN D PO Take by mouth      ELDERBERRY PO Take by mouth      esomeprazole (NEXIUM) 40 MG delayed release capsule Take 1 capsule by mouth daily      fluticasone (FLONASE) 50 MCG/ACT nasal spray 2 sprays by Nasal route      irbesartan (AVAPRO) 150 MG tablet Take 1 tablet by mouth      loratadine (CLARITIN) 10 MG tablet Take 1 tablet by mouth       No current facility-administered medications for this visit.      Allergies   Allergen Reactions    Codeine Hives     Pt states she is not allergic    Hydrocodone-Acetaminophen Other (See Comments) and Rash     Pt states dizzy and funny feeling  Pt states dizzy and funny feeling       Social History     Tobacco Use    Smoking status: Never    Smokeless tobacco: Never   Substance Use Topics    Alcohol use: No     Alcohol/week: 0.0 standard drinks of alcohol    Drug use: No        Review of Systems   Constitutional:

## 2024-06-17 ENCOUNTER — OFFICE VISIT (OUTPATIENT)
Age: 59
End: 2024-06-17

## 2024-06-17 VITALS
TEMPERATURE: 97.4 F | SYSTOLIC BLOOD PRESSURE: 124 MMHG | HEART RATE: 60 BPM | OXYGEN SATURATION: 99 % | RESPIRATION RATE: 16 BRPM | DIASTOLIC BLOOD PRESSURE: 84 MMHG

## 2024-06-17 DIAGNOSIS — L56.4 POLYMORPHIC LIGHT ERUPTION: Primary | ICD-10-CM

## 2024-06-17 RX ORDER — TRIAMCINOLONE ACETONIDE 1 MG/G
OINTMENT TOPICAL 2 TIMES DAILY
Qty: 30 G | Refills: 0 | Status: SHIPPED | OUTPATIENT
Start: 2024-06-17 | End: 2024-06-24

## 2024-06-17 RX ORDER — SUCRALFATE 1 G/1
1 TABLET ORAL 4 TIMES DAILY
COMMUNITY
Start: 2024-04-30 | End: 2025-04-30

## 2024-06-17 ASSESSMENT — ENCOUNTER SYMPTOMS
FACIAL SWELLING: 0
COLOR CHANGE: 0
SHORTNESS OF BREATH: 0
VOMITING: 0
NAIL CHANGES: 0
COUGH: 0
RHINORRHEA: 0
DIARRHEA: 0
EYE PAIN: 0
SORE THROAT: 0
SINUS PRESSURE: 0
SINUS PAIN: 0

## 2024-06-17 NOTE — PROGRESS NOTES
PROGRESS NOTE    SUBJECTIVE:   Judith Alcantara is a 59 y.o. female seen for rash to her arms and upper shoulders after being at the beach last week. Reports that she has had allergic reactions following sun exposure in the past for which she used Triamcinolone for but her supply at home is quite old (~2018). Denies fever, chills, chest pain, shortness of breath, known poison ivy/oak exposure, changes to her soaps/detergents/perfumes/lotions, new foods/supplements/medications, others from the vacation with similar symptoms, and/or obvious insect or mite bites.    Chief Complaint    Rash         Rash  This is a new problem. Episode onset: 3 days ago. The affected locations include the left arm, left elbow, left wrist, left shoulder, right wrist, right elbow, right arm and right shoulder. The rash is characterized by redness and itchiness. Associated with: prolong sun exposure. Pertinent negatives include no anorexia, congestion, cough, diarrhea, eye pain, facial edema, fatigue, fever, joint pain, nail changes, rhinorrhea, shortness of breath, sore throat or vomiting. Past treatments include topical steroids. The treatment provided no relief.       Current Outpatient Medications   Medication Sig Dispense Refill    sucralfate (CARAFATE) 1 GM tablet Take 1 tablet by mouth 4 times daily      triamcinolone (KENALOG) 0.1 % ointment Apply topically 2 times daily for 7 days 30 g 0    LORazepam (ATIVAN) 0.5 MG tablet TAKE 1 TABLET BY MOUTH TWICE DAILY AS NEEDED FOR STRESS      Multiple Vitamins-Minerals (EMERGEN-C VITAMIN C PO) Take by mouth      Multiple Vitamin (MULTIVITAMIN PO) Take by mouth      atorvastatin (LIPITOR) 20 MG tablet Take 1 tablet by mouth every evening      VITAMIN D PO Take by mouth      ELDERBERRY PO Take by mouth      esomeprazole (NEXIUM) 40 MG delayed release capsule Take 1 capsule by mouth daily      fluticasone (FLONASE) 50 MCG/ACT nasal spray 2 sprays by Nasal route      irbesartan (AVAPRO) 150 MG

## 2024-06-28 ENCOUNTER — OFFICE VISIT (OUTPATIENT)
Age: 59
End: 2024-06-28

## 2024-06-28 VITALS
DIASTOLIC BLOOD PRESSURE: 80 MMHG | OXYGEN SATURATION: 99 % | SYSTOLIC BLOOD PRESSURE: 118 MMHG | HEART RATE: 72 BPM | TEMPERATURE: 98.1 F

## 2024-06-28 DIAGNOSIS — M26.622 ARTHRALGIA OF LEFT TEMPOROMANDIBULAR JOINT: Primary | ICD-10-CM

## 2024-06-28 RX ORDER — NAPROXEN 250 MG/1
250 TABLET ORAL 2 TIMES DAILY WITH MEALS
Qty: 28 TABLET | Refills: 0 | Status: CANCELLED | OUTPATIENT
Start: 2024-06-28 | End: 2024-07-12

## 2024-06-28 ASSESSMENT — ENCOUNTER SYMPTOMS
SHORTNESS OF BREATH: 0
FACIAL SWELLING: 0
SINUS PRESSURE: 0
EYE DISCHARGE: 0
EYE ITCHING: 0
EYE REDNESS: 0
EYE PAIN: 0
CHEST TIGHTNESS: 0
SINUS PAIN: 0
TROUBLE SWALLOWING: 0
RHINORRHEA: 0
COUGH: 0
VOICE CHANGE: 0
SORE THROAT: 0

## 2024-06-28 NOTE — PROGRESS NOTES
PROGRESS NOTE    SUBJECTIVE:   Judith Alcantara is a 59 y.o. female seen for popping in her jaw.    Chief Complaint    Jaw Pain         Ms. Gonzales reports that yesterday while eating a sandwhich she noticed a popping sensation in the left TMJ, and felt as if her jaw was not in the correct place. Today the sensation not as severe, but she is still having some popping when opening and closing her mouth, and she has some mild tenderness in the left TMJ. She denies any significant pain in the jaw, headaches, neck pain or stiffness, recent injury, trauma, or surgery to the area, ear pain or tinnitus, or fevers or chills. She does report that she grinds her teeth at night and that she was instructed by her dentis to wear a mouth guard at night, but she has not been doing so. She has not tried any treatments at home.      Current Outpatient Medications   Medication Sig Dispense Refill    LORazepam (ATIVAN) 0.5 MG tablet TAKE 1 TABLET BY MOUTH TWICE DAILY AS NEEDED FOR STRESS      Multiple Vitamins-Minerals (EMERGEN-C VITAMIN C PO) Take by mouth      Multiple Vitamin (MULTIVITAMIN PO) Take by mouth      atorvastatin (LIPITOR) 20 MG tablet Take 1 tablet by mouth every evening      VITAMIN D PO Take by mouth      ELDERBERRY PO Take by mouth      esomeprazole (NEXIUM) 40 MG delayed release capsule Take 1 capsule by mouth daily      fluticasone (FLONASE) 50 MCG/ACT nasal spray 2 sprays by Nasal route      irbesartan (AVAPRO) 150 MG tablet Take 1 tablet by mouth      loratadine (CLARITIN) 10 MG tablet Take 1 tablet by mouth      sucralfate (CARAFATE) 1 GM tablet Take 1 tablet by mouth 4 times daily       No current facility-administered medications for this visit.      Allergies   Allergen Reactions    Codeine Hives     Pt states she is not allergic    Hydrocodone-Acetaminophen Other (See Comments) and Rash     Pt states dizzy and funny feeling  Pt states dizzy and funny feeling       Social History     Tobacco Use    Smoking

## 2024-12-01 NOTE — PROGRESS NOTES
PROGRESS NOTE    SUBJECTIVE:   Judith Alcantara is a 59 y.o. female seen in the employer based health center located at [facility] for employment physical for annual employer Health Risk Assessment and biometric screening as required to obtain their insurance premium discounts.  No complaints or concerns at this time.     Chief Complaint    Health Assessment           OBJECTIVE:  BP (!) 154/84 (Site: Left Upper Arm, Position: Sitting, Cuff Size: Medium Adult)   Pulse 71   Ht 1.664 m (5' 5.5\")   Wt 88.5 kg (195 lb)   BMI 31.96 kg/m²    Additional Measurements    12/12/24 1030   Waist (Inches): 34.5 in        Physical Exam  Vitals reviewed.   Constitutional:       General: She is awake. She is not in acute distress.     Appearance: Normal appearance. She is well-developed and well-groomed.      Comments: Successful venipuncture of the LAC performed, pt tolerated procedure well   Cardiovascular:      Rate and Rhythm: Normal rate and regular rhythm.      Heart sounds: Normal heart sounds.   Pulmonary:      Effort: Pulmonary effort is normal.   Neurological:      Mental Status: She is alert.   Psychiatric:         Behavior: Behavior is cooperative.       ASSESSMENT and PLAN    Judith was seen today for health assessment.    Diagnoses and all orders for this visit:    Encounter for biometric screening  -     Glucose, Random  -     Lipid Panel W/ Chol/HDL Ratio  -     Hemoglobin A1C  -     COLLECTION VENOUS BLOOD,VENIPUNCTURE        Discussed HR notification process for credits and that I will contact with results when available    Counseled on benefits of having a primary care provider which includes, but is not limited to, continuity of care and having a medical home when concerns arise. Also enforced that onsite clinic policy states that we are not to take the place of a primary care provider, pt verbalized understanding.     I have reviewed the patient's medication list, past medical, family, social,

## 2024-12-11 ENCOUNTER — OFFICE VISIT (OUTPATIENT)
Age: 59
End: 2024-12-11

## 2024-12-11 VITALS
OXYGEN SATURATION: 98 % | DIASTOLIC BLOOD PRESSURE: 92 MMHG | RESPIRATION RATE: 16 BRPM | SYSTOLIC BLOOD PRESSURE: 140 MMHG | HEART RATE: 70 BPM

## 2024-12-11 DIAGNOSIS — Z01.30 BLOOD PRESSURE CHECK: Primary | ICD-10-CM

## 2024-12-11 PROBLEM — D64.9 ANEMIA: Status: ACTIVE | Noted: 2024-12-11

## 2024-12-11 PROBLEM — K21.9 GERD (GASTROESOPHAGEAL REFLUX DISEASE): Status: ACTIVE | Noted: 2024-12-11

## 2024-12-11 PROBLEM — K63.5 COLON POLYPS: Status: ACTIVE | Noted: 2024-12-02

## 2024-12-11 PROBLEM — K64.8 INTERNAL HEMORRHOIDS: Status: ACTIVE | Noted: 2024-12-11

## 2024-12-11 PROBLEM — K44.9 HIATAL HERNIA: Status: ACTIVE | Noted: 2024-12-11

## 2024-12-11 PROBLEM — N95.1 PERIMENOPAUSAL: Status: ACTIVE | Noted: 2024-12-11

## 2024-12-11 PROBLEM — K59.00 CONSTIPATION: Status: ACTIVE | Noted: 2024-12-11

## 2024-12-11 PROBLEM — M50.30 DDD (DEGENERATIVE DISC DISEASE), CERVICAL: Status: ACTIVE | Noted: 2024-12-11

## 2024-12-11 PROBLEM — R13.10 DYSPHAGIA: Status: ACTIVE | Noted: 2024-12-11

## 2024-12-11 PROBLEM — R42 VERTIGO AS LATE EFFECT OF CEREBROVASCULAR ACCIDENT (CVA): Status: ACTIVE | Noted: 2024-12-11

## 2024-12-11 PROBLEM — K31.7 POLYP OF STOMACH AND DUODENUM: Status: ACTIVE | Noted: 2024-12-02

## 2024-12-11 PROBLEM — K22.2 ESOPHAGEAL RING: Status: ACTIVE | Noted: 2024-12-02

## 2024-12-11 PROBLEM — F43.9 REACTION TO SEVERE STRESS, UNSPECIFIED: Status: ACTIVE | Noted: 2024-12-11

## 2024-12-11 PROBLEM — Z92.89 PERSONAL HISTORY OF OTHER MEDICAL TREATMENT: Status: ACTIVE | Noted: 2024-12-11

## 2024-12-11 PROBLEM — J30.9 ALLERGIC RHINITIS: Status: ACTIVE | Noted: 2024-12-11

## 2024-12-11 PROBLEM — Z86.0101 PERSONAL HISTORY OF ADENOMATOUS AND SERRATED COLON POLYPS: Status: ACTIVE | Noted: 2024-12-02

## 2024-12-11 PROBLEM — I69.398 VERTIGO AS LATE EFFECT OF CEREBROVASCULAR ACCIDENT (CVA): Status: ACTIVE | Noted: 2024-12-11

## 2024-12-11 RX ORDER — FAMOTIDINE 40 MG/1
40 TABLET, FILM COATED ORAL NIGHTLY
COMMUNITY
Start: 2024-12-02

## 2024-12-11 ASSESSMENT — ENCOUNTER SYMPTOMS
VOMITING: 0
SHORTNESS OF BREATH: 0
PHOTOPHOBIA: 0
NAUSEA: 0

## 2024-12-11 NOTE — PROGRESS NOTES
PROGRESS NOTE    SUBJECTIVE:   Judith Alcantara is a 59 y.o. female seen [facility] for blood pressure check as she has been under more stress the past 2 days with family emergencies and a death anniversary and has noticed more fatigue and intermittent dull chest pains.      BLOOD PRESSURE CHECK      Current Outpatient Medications   Medication Sig Dispense Refill    sucralfate (CARAFATE) 1 GM tablet Take 1 tablet by mouth 4 times daily      LORazepam (ATIVAN) 0.5 MG tablet TAKE 1 TABLET BY MOUTH TWICE DAILY AS NEEDED FOR STRESS      Multiple Vitamins-Minerals (EMERGEN-C VITAMIN C PO) Take by mouth      Multiple Vitamin (MULTIVITAMIN PO) Take by mouth      atorvastatin (LIPITOR) 20 MG tablet Take 1 tablet by mouth every evening      VITAMIN D PO Take by mouth      ELDERBERRY PO Take by mouth      esomeprazole (NEXIUM) 40 MG delayed release capsule Take 1 capsule by mouth daily      fluticasone (FLONASE) 50 MCG/ACT nasal spray 2 sprays by Nasal route      irbesartan (AVAPRO) 150 MG tablet Take 1 tablet by mouth      loratadine (CLARITIN) 10 MG tablet Take 1 tablet by mouth       No current facility-administered medications for this visit.      Allergies   Allergen Reactions    Codeine Hives     Pt states she is not allergic    Hydrocodone-Acetaminophen Other (See Comments) and Rash     Pt states dizzy and funny feeling  Pt states dizzy and funny feeling       Social History     Tobacco Use    Smoking status: Never    Smokeless tobacco: Never   Substance Use Topics    Alcohol use: No     Alcohol/week: 0.0 standard drinks of alcohol    Drug use: No        Review of Systems   Constitutional:  Positive for fatigue. Negative for diaphoresis.   Eyes:  Negative for photophobia and visual disturbance.   Respiratory:  Negative for shortness of breath.    Cardiovascular:  Negative for chest pain (dull and intermittent) and palpitations.   Gastrointestinal:  Negative

## 2024-12-12 ENCOUNTER — OFFICE VISIT (OUTPATIENT)
Age: 59
End: 2024-12-12

## 2024-12-12 VITALS
BODY MASS INDEX: 31.34 KG/M2 | HEART RATE: 71 BPM | SYSTOLIC BLOOD PRESSURE: 154 MMHG | DIASTOLIC BLOOD PRESSURE: 84 MMHG | HEIGHT: 66 IN | WEIGHT: 195 LBS

## 2024-12-12 DIAGNOSIS — Z00.8 ENCOUNTER FOR BIOMETRIC SCREENING: Primary | ICD-10-CM

## 2024-12-13 LAB
CHOLEST SERPL-MCNC: 185 MG/DL (ref 100–199)
CHOLEST/HDLC SERPL: 4.1 RATIO (ref 0–4.4)
GLUCOSE SERPL-MCNC: 79 MG/DL (ref 70–99)
HBA1C MFR BLD: 5.9 % (ref 4.8–5.6)
HDLC SERPL-MCNC: 45 MG/DL
LDLC SERPL CALC-MCNC: 127 MG/DL (ref 0–99)
TRIGL SERPL-MCNC: 70 MG/DL (ref 0–149)
VLDLC SERPL CALC-MCNC: 13 MG/DL (ref 5–40)

## 2025-02-20 ENCOUNTER — OFFICE VISIT (OUTPATIENT)
Dept: VASCULAR SURGERY | Age: 60
End: 2025-02-20
Payer: COMMERCIAL

## 2025-02-20 VITALS
HEIGHT: 66 IN | WEIGHT: 184 LBS | BODY MASS INDEX: 29.57 KG/M2 | OXYGEN SATURATION: 92 % | DIASTOLIC BLOOD PRESSURE: 83 MMHG | SYSTOLIC BLOOD PRESSURE: 138 MMHG | HEART RATE: 69 BPM

## 2025-02-20 DIAGNOSIS — I73.9 PAD (PERIPHERAL ARTERY DISEASE): Primary | ICD-10-CM

## 2025-02-20 PROCEDURE — 3075F SYST BP GE 130 - 139MM HG: CPT | Performed by: NURSE PRACTITIONER

## 2025-02-20 PROCEDURE — 3078F DIAST BP <80 MM HG: CPT | Performed by: NURSE PRACTITIONER

## 2025-02-20 PROCEDURE — 99213 OFFICE O/P EST LOW 20 MIN: CPT | Performed by: NURSE PRACTITIONER

## 2025-02-20 PROCEDURE — G2211 COMPLEX E/M VISIT ADD ON: HCPCS | Performed by: NURSE PRACTITIONER

## 2025-02-20 NOTE — PROGRESS NOTES
DATE OF VISIT: 2/20/2025      [facility]  Judith Alcantara is a 59 y.o. female here in follow up for her yearly follow up for her upper extremity duplex.  No issues since her last visit. She is taking cholesterol lowering medication. No ASA or blooding thinning medication.            MEDICAL HISTORY:   Past Medical History:   Diagnosis Date    DDD (degenerative disc disease), cervical 2016    assoc radicular pain to shoulder/arm, xray cspine 2017    H/O pelvic ultrasound 06/2015    + hydrosalpinx    Hiatal hernia with GERD 07/2015    s/p EGD + [place], + esophageal ring    HLD (hyperlipidemia) 2015    atorvastatin 12/2016, kaleigh score 0 may 2015    HTN (hypertension) 1/1/2016    Exercise Nuclear Stress test 5/2016 = normal, CXR nl, toprol = fatigue/mood    HTN (hypertension) 1/1/2016    HTN (hypertension) 2016    Exercise Nuclear Stress test 5/2016 = normal, CXR nl, toprol = fatigue/mood    Internal hemorrhoid 07/2015    Perimenopausal 2016    conrad root         SURGICAL HISTORY:   Past Surgical History:   Procedure Laterality Date    GYN      hysterectomy    HYSTERECTOMY (CERVIX STATUS UNKNOWN)      MYOMECTOMY      PARTIAL HYSTERECTOMY (CERVIX NOT REMOVED)      2003    MO UNLISTED PROCEDURE ABDOMEN PERITONEUM & OMENTUM      hernia    TUBAL LIGATION         Review of Systems:  Constitutional:   Negative for fevers and unexplained weight loss.  Respiratory:   Negative for hemoptysis.  Cardiovascular:   Negative except as noted in HPI.  Musculoskeletal:  Negative for active, unexplained/severe joint pain.      ALLERGIES:   Allergies   Allergen Reactions    Codeine Hives     Pt states she is not allergic    Hydrocodone-Acetaminophen Other (See Comments) and Rash     Pt states dizzy and funny feeling  Pt states dizzy and funny feeling       CURRENT MEDICATIONS:  Current Outpatient Medications   Medication Sig Dispense Refill    famotidine (PEPCID) 40 MG tablet Take 1 tablet by mouth nightly at bedtime.      sucralfate

## 2025-02-21 ENCOUNTER — TRANSCRIBE ORDERS (OUTPATIENT)
Dept: SCHEDULING | Age: 60
End: 2025-02-21

## 2025-02-21 DIAGNOSIS — Z12.31 VISIT FOR SCREENING MAMMOGRAM: Primary | ICD-10-CM

## 2025-07-03 DIAGNOSIS — L56.4 POLYMORPHIC LIGHT ERUPTION: Primary | ICD-10-CM

## 2025-07-03 RX ORDER — TRIAMCINOLONE ACETONIDE 1 MG/G
OINTMENT TOPICAL 2 TIMES DAILY
Qty: 30 G | Refills: 1 | Status: SHIPPED | OUTPATIENT
Start: 2025-07-03 | End: 2025-07-10

## 2025-07-03 NOTE — TELEPHONE ENCOUNTER
Patient contacted clinic requesting medication refill of their triamcinolone therapy for sun reaction rash that develops with prolonged sun exposure as [place] the beach recently. Will provide refills to pharmacy on file as requested. RTC as needed for complaints or concerns that may arise    Marquis Nevarez, APRN - CNP